# Patient Record
(demographics unavailable — no encounter records)

---

## 2025-01-24 NOTE — PHYSICAL EXAM
[Chaperone Present] : A chaperone was present in the examining room during all aspects of the physical examination [Appropriately responsive] : appropriately responsive [Alert] : alert [No Acute Distress] : no acute distress [Soft] : soft [Non-tender] : non-tender [Non-distended] : non-distended [Oriented x3] : oriented x3 [Examination Of The Breasts] : a normal appearance [No Masses] : no breast masses were palpable [Labia Majora] : normal [Labia Minora] : normal [Normal] : normal [Moderate] : There was moderate vaginal bleeding [Uterine Adnexae] : non-palpable

## 2025-01-24 NOTE — PLAN
(0) Normal symmetrical movements
[FreeTextEntry1] : During this visit 45 minutes were spent face-to-face with greater than 50% of the time dedicated to discussing her perimenopausal symptoms and treatment options.  Differential diagnosis of dysfunctional uterine bleeding discussed at length including structural, hormonal, and malignant causes.  1. She had a recent lab panel drawn by her PMD.  2. A pelvic ultrasound was ordered as well.  3. We also discussed the need for hysteroscopy with biopsy to R/O structural and malignant causes. The procedure was discussed in detail. She will premedicate with motrin as there is cramping associated with this procedure. She has had it in the past and it was very painful. Will schedule with sedation.  We discussed in detail her perimenopausal/menopausal symptoms and how they affect her daily quality of life. We reviewed the risks of untreated vasomotor symptoms and diminished sleep as a cardiac risk factor. Her medical history was reviewed in detail.  We discussed systemic menopause hormone therapy, as well as nonhormonal treatment options for her menopausal symptoms.  Will wait to prescribe until uterine evaluation completed. I also recommended the Mirena IUD at the time if D&C. Which she agrees to. She will then only require estrogen supplementation for perimenopausal symptom relief.  Continue breast care with breast specialist.  Pap done today.   She is aware of the need for colonoscopy.

## 2025-01-24 NOTE — REVIEW OF SYSTEMS
[Negative] : Breast [Fatigue] : fatigue [Night Sweats] : night sweats [Abn Vaginal bleeding] : abnormal vaginal bleeding [Sleep Disturbances] : sleep disturbances [Hot Flashes] : hot flashes

## 2025-01-24 NOTE — HISTORY OF PRESENT ILLNESS
[Patient reported PAP Smear was normal] : Patient reported PAP Smear was normal [LMP unknown] : LMP unknown [N] : Patient does not use contraception [Y] : Positive pregnancy history [unknown] : Patient is unsure of the date of her LMP [Menarche Age: ____] : age at menarche was [unfilled] [Currently Active] : currently active [Men] : men [Mammogramdate] : 07/22/24 [TextBox_19] : BR3 [BreastSonogramDate] : 07/22/24 [TextBox_25] : BR3 [PapSmeardate] : 2020 [TextBox_43] : NEVER [PGHxTotal] : 3 [[facility]] : 1 [Banneriving] : 1 [PGHxABSpont] : 2 [FreeTextEntry1] : C/S: 1

## 2025-01-24 NOTE — END OF VISIT
[FreeTextEntry3] : I, Lauren Pablo solely acted as scribe for Dr. Ana M Coronel on [01/24/2025] All medical entries made by the Scribe were at my, Dr. Rowland, direction and personally dictated by me on [01/24/2025]. I have reviewed the chart and agree that the record accurately reflects my personal performance of the history, physical exam, assessment and plan. I have also personally directed, reviewed, and agreed with the chart.